# Patient Record
Sex: MALE | Race: WHITE | NOT HISPANIC OR LATINO
[De-identification: names, ages, dates, MRNs, and addresses within clinical notes are randomized per-mention and may not be internally consistent; named-entity substitution may affect disease eponyms.]

---

## 2018-09-11 PROBLEM — Z00.00 ENCOUNTER FOR PREVENTIVE HEALTH EXAMINATION: Status: ACTIVE | Noted: 2018-09-11

## 2018-09-21 ENCOUNTER — APPOINTMENT (OUTPATIENT)
Dept: ORTHOPEDIC SURGERY | Facility: CLINIC | Age: 28
End: 2018-09-21
Payer: COMMERCIAL

## 2018-09-21 VITALS
WEIGHT: 185 LBS | HEIGHT: 70 IN | HEART RATE: 86 BPM | DIASTOLIC BLOOD PRESSURE: 86 MMHG | SYSTOLIC BLOOD PRESSURE: 124 MMHG | BODY MASS INDEX: 26.48 KG/M2

## 2018-09-21 DIAGNOSIS — Z78.9 OTHER SPECIFIED HEALTH STATUS: ICD-10-CM

## 2018-09-21 DIAGNOSIS — F19.90 OTHER PSYCHOACTIVE SUBSTANCE USE, UNSPECIFIED, UNCOMPLICATED: ICD-10-CM

## 2018-09-21 DIAGNOSIS — Z60.2 PROBLEMS RELATED TO LIVING ALONE: ICD-10-CM

## 2018-09-21 PROCEDURE — 99205 OFFICE O/P NEW HI 60 MIN: CPT

## 2018-09-21 SDOH — SOCIAL STABILITY - SOCIAL INSECURITY: PROBLEMS RELATED TO LIVING ALONE: Z60.2

## 2018-09-24 PROBLEM — F19.90 OCCASIONAL RECREATIONAL DRUG USE: Status: ACTIVE | Noted: 2018-09-21

## 2018-09-24 PROBLEM — Z78.9 CONSUMES ALCOHOL OCCASIONALLY: Status: ACTIVE | Noted: 2018-09-21

## 2018-10-16 ENCOUNTER — OUTPATIENT (OUTPATIENT)
Dept: OUTPATIENT SERVICES | Facility: HOSPITAL | Age: 28
LOS: 1 days | End: 2018-10-16
Payer: COMMERCIAL

## 2018-10-16 VITALS
HEART RATE: 81 BPM | TEMPERATURE: 99 F | RESPIRATION RATE: 14 BRPM | WEIGHT: 184.09 LBS | OXYGEN SATURATION: 96 % | DIASTOLIC BLOOD PRESSURE: 93 MMHG | HEIGHT: 67 IN | SYSTOLIC BLOOD PRESSURE: 136 MMHG

## 2018-10-16 DIAGNOSIS — Z98.890 OTHER SPECIFIED POSTPROCEDURAL STATES: Chronic | ICD-10-CM

## 2018-10-16 DIAGNOSIS — M25.851 OTHER SPECIFIED JOINT DISORDERS, RIGHT HIP: ICD-10-CM

## 2018-10-16 DIAGNOSIS — M25.859 OTHER SPECIFIED JOINT DISORDERS, UNSPECIFIED HIP: ICD-10-CM

## 2018-10-16 DIAGNOSIS — Z01.818 ENCOUNTER FOR OTHER PREPROCEDURAL EXAMINATION: ICD-10-CM

## 2018-10-16 LAB
HCT VFR BLD CALC: 43.7 % — SIGNIFICANT CHANGE UP (ref 39–50)
HGB BLD-MCNC: 14.9 G/DL — SIGNIFICANT CHANGE UP (ref 13–17)
MCHC RBC-ENTMCNC: 29.4 PG — SIGNIFICANT CHANGE UP (ref 27–34)
MCHC RBC-ENTMCNC: 34.1 GM/DL — SIGNIFICANT CHANGE UP (ref 32–36)
MCV RBC AUTO: 86.4 FL — SIGNIFICANT CHANGE UP (ref 80–100)
PLATELET # BLD AUTO: 290 K/UL — SIGNIFICANT CHANGE UP (ref 150–400)
RBC # BLD: 5.06 M/UL — SIGNIFICANT CHANGE UP (ref 4.2–5.8)
RBC # FLD: 12.9 % — SIGNIFICANT CHANGE UP (ref 10.3–14.5)
WBC # BLD: 7.46 K/UL — SIGNIFICANT CHANGE UP (ref 3.8–10.5)
WBC # FLD AUTO: 7.46 K/UL — SIGNIFICANT CHANGE UP (ref 3.8–10.5)

## 2018-10-16 PROCEDURE — G0463: CPT

## 2018-10-16 PROCEDURE — 85027 COMPLETE CBC AUTOMATED: CPT

## 2018-10-16 RX ORDER — SODIUM CHLORIDE 9 MG/ML
3 INJECTION INTRAMUSCULAR; INTRAVENOUS; SUBCUTANEOUS EVERY 8 HOURS
Qty: 0 | Refills: 0 | Status: DISCONTINUED | OUTPATIENT
Start: 2018-10-23 | End: 2018-10-23

## 2018-10-16 RX ORDER — LIDOCAINE HCL 20 MG/ML
0.2 VIAL (ML) INJECTION ONCE
Qty: 0 | Refills: 0 | Status: DISCONTINUED | OUTPATIENT
Start: 2018-10-23 | End: 2018-10-23

## 2018-10-16 RX ORDER — CEFAZOLIN SODIUM 1 G
2000 VIAL (EA) INJECTION ONCE
Qty: 0 | Refills: 0 | Status: DISCONTINUED | OUTPATIENT
Start: 2018-10-23 | End: 2018-11-07

## 2018-10-16 NOTE — H&P PST ADULT - NSANTHOSAYNRD_GEN_A_CORE
No. DALJIT screening performed.  STOP BANG Legend: 0-2 = LOW Risk; 3-4 = INTERMEDIATE Risk; 5-8 = HIGH Risk

## 2018-10-16 NOTE — H&P PST ADULT - HISTORY OF PRESENT ILLNESS
Mr. Miller is a 28 year old man with no significant medical history with a "bone spur" causing increasing pain when working out and other activities including [facility] now scheduled for R hip Arthroscopy Resection of impingement and labral repair.

## 2018-10-16 NOTE — H&P PST ADULT - PROBLEM SELECTOR PLAN 1
Scheduled for hip arthroscopy, resection of impingement and labral repair.  Preop instructions given.

## 2018-10-16 NOTE — H&P PST ADULT - MALLAMPATI CLASS
Detail Level: Zone Detail Level: Detailed Class II - visualization of the soft palate, fauces, and uvula

## 2018-10-22 ENCOUNTER — TRANSCRIPTION ENCOUNTER (OUTPATIENT)
Age: 28
End: 2018-10-22

## 2018-10-23 ENCOUNTER — APPOINTMENT (OUTPATIENT)
Dept: ORTHOPEDIC SURGERY | Facility: HOSPITAL | Age: 28
End: 2018-10-23

## 2018-10-23 ENCOUNTER — OUTPATIENT (OUTPATIENT)
Dept: OUTPATIENT SERVICES | Facility: HOSPITAL | Age: 28
LOS: 1 days | End: 2018-10-23
Payer: COMMERCIAL

## 2018-10-23 VITALS
SYSTOLIC BLOOD PRESSURE: 130 MMHG | HEIGHT: 67 IN | DIASTOLIC BLOOD PRESSURE: 74 MMHG | OXYGEN SATURATION: 99 % | TEMPERATURE: 98 F | HEART RATE: 70 BPM | RESPIRATION RATE: 20 BRPM | WEIGHT: 184.09 LBS

## 2018-10-23 VITALS
TEMPERATURE: 98 F | SYSTOLIC BLOOD PRESSURE: 133 MMHG | HEART RATE: 75 BPM | DIASTOLIC BLOOD PRESSURE: 73 MMHG | RESPIRATION RATE: 16 BRPM | OXYGEN SATURATION: 100 %

## 2018-10-23 DIAGNOSIS — Z01.818 ENCOUNTER FOR OTHER PREPROCEDURAL EXAMINATION: ICD-10-CM

## 2018-10-23 DIAGNOSIS — Z98.890 OTHER SPECIFIED POSTPROCEDURAL STATES: Chronic | ICD-10-CM

## 2018-10-23 DIAGNOSIS — M25.859 OTHER SPECIFIED JOINT DISORDERS, UNSPECIFIED HIP: ICD-10-CM

## 2018-10-23 PROCEDURE — 97161 PT EVAL LOW COMPLEX 20 MIN: CPT

## 2018-10-23 PROCEDURE — 76000 FLUOROSCOPY <1 HR PHYS/QHP: CPT

## 2018-10-23 PROCEDURE — C1889: CPT

## 2018-10-23 PROCEDURE — 29916 HIP ARTHRO W/LABRAL REPAIR: CPT | Mod: RT

## 2018-10-23 PROCEDURE — C1713: CPT

## 2018-10-23 PROCEDURE — 29914 HIP ARTHRO W/FEMOROPLASTY: CPT | Mod: RT

## 2018-10-23 RX ORDER — OXYCODONE AND ACETAMINOPHEN 5; 325 MG/1; MG/1
5-325 TABLET ORAL
Qty: 30 | Refills: 0 | Status: ACTIVE | COMMUNITY
Start: 2018-10-23 | End: 1900-01-01

## 2018-10-23 RX ORDER — HYDROMORPHONE HYDROCHLORIDE 2 MG/ML
0.25 INJECTION INTRAMUSCULAR; INTRAVENOUS; SUBCUTANEOUS
Qty: 0 | Refills: 0 | Status: DISCONTINUED | OUTPATIENT
Start: 2018-10-23 | End: 2018-10-23

## 2018-10-23 RX ORDER — HYDROMORPHONE HYDROCHLORIDE 2 MG/ML
0.5 INJECTION INTRAMUSCULAR; INTRAVENOUS; SUBCUTANEOUS
Qty: 0 | Refills: 0 | Status: DISCONTINUED | OUTPATIENT
Start: 2018-10-23 | End: 2018-10-23

## 2018-10-23 RX ORDER — ASPIRIN/ACETAMINOPHEN/CAFFEINE 500-325-65
325 POWDER IN PACKET (EA) ORAL
Qty: 28 | Refills: 0 | Status: ACTIVE | COMMUNITY
Start: 2018-10-23 | End: 1900-01-01

## 2018-10-23 RX ORDER — OXYCODONE HYDROCHLORIDE 5 MG/1
5 TABLET ORAL
Qty: 0 | Refills: 0 | Status: DISCONTINUED | OUTPATIENT
Start: 2018-10-23 | End: 2018-10-23

## 2018-10-23 RX ORDER — SODIUM CHLORIDE 9 MG/ML
1000 INJECTION, SOLUTION INTRAVENOUS
Qty: 0 | Refills: 0 | Status: DISCONTINUED | OUTPATIENT
Start: 2018-10-23 | End: 2018-11-07

## 2018-10-23 RX ADMIN — HYDROMORPHONE HYDROCHLORIDE 0.5 MILLIGRAM(S): 2 INJECTION INTRAMUSCULAR; INTRAVENOUS; SUBCUTANEOUS at 12:00

## 2018-10-23 RX ADMIN — SODIUM CHLORIDE 3 MILLILITER(S): 9 INJECTION INTRAMUSCULAR; INTRAVENOUS; SUBCUTANEOUS at 07:43

## 2018-10-23 RX ADMIN — HYDROMORPHONE HYDROCHLORIDE 0.5 MILLIGRAM(S): 2 INJECTION INTRAMUSCULAR; INTRAVENOUS; SUBCUTANEOUS at 11:30

## 2018-10-23 RX ADMIN — HYDROMORPHONE HYDROCHLORIDE 0.5 MILLIGRAM(S): 2 INJECTION INTRAMUSCULAR; INTRAVENOUS; SUBCUTANEOUS at 11:45

## 2018-10-23 RX ADMIN — HYDROMORPHONE HYDROCHLORIDE 0.5 MILLIGRAM(S): 2 INJECTION INTRAMUSCULAR; INTRAVENOUS; SUBCUTANEOUS at 12:15

## 2018-10-23 RX ADMIN — SODIUM CHLORIDE 125 MILLILITER(S): 9 INJECTION, SOLUTION INTRAVENOUS at 12:08

## 2018-10-23 NOTE — PHYSICAL THERAPY INITIAL EVALUATION ADULT - GENERAL OBSERVATIONS, REHAB EVAL
Pt ken 35 min eval well. Pt agreed to session. Pt rec'd in chair, family at bedside, and NAD. Pt TTWB RLE as per ortho. Pt reports using axillary crutches in the past, reports comfortable with them previously.

## 2018-10-23 NOTE — PHYSICAL THERAPY INITIAL EVALUATION ADULT - GAIT DEVIATIONS NOTED, PT EVAL
pt able to walk and talk, no loss of balance, pt with good understanding of weight bearing restrictions

## 2018-10-23 NOTE — ASU DISCHARGE PLAN (ADULT/PEDIATRIC). - NURSING INSTRUCTIONS
Please ensure patient is seen by physical therapy for crutch training before discharge and please remind patient of:  please take aspirin 325mg x1 tablet twice a day, percocet for pain relief as needed, sent to HOME PHARMACY via [employer].  Toe touch weight bearing to right lower extremity x 2 weeks.   Please call Dr. Fleming office and make a follow up appointment x 2 weeks

## 2018-10-23 NOTE — BRIEF OPERATIVE NOTE - PROCEDURE
<<-----Click on this checkbox to enter Procedure Hip arthroscopy  10/23/2018  right hip  Active  LALY

## 2018-10-23 NOTE — PHYSICAL THERAPY INITIAL EVALUATION ADULT - PLANNED THERAPY INTERVENTIONS, PT EVAL
Outpatient PT at MD discretion. Pt demonstrates independence with functional mobility, good understanding of sequencing with axillary crutches and knowledge of weight bearing restrictions. Patient is cleared for D/C home from physical therapy standpoint. Pt and family aware and in agreement. ART Allen made aware.

## 2018-10-23 NOTE — BRIEF OPERATIVE NOTE - PRE-OP DX
Femoral acetabular impingement  10/23/2018  right hip  Active  Adi Samayoa  Tear of right acetabular labrum, subsequent encounter  10/23/2018  right hip  Active  Adi Samayoa

## 2018-10-23 NOTE — PHYSICAL THERAPY INITIAL EVALUATION ADULT - MANUAL MUSCLE TESTING RESULTS, REHAB EVAL
grossly assessed due to/BUE and LLE at least 4/5; RLE not formally assessed due to surgical status, but grossly appearing at least 3/5

## 2018-10-23 NOTE — PHYSICAL THERAPY INITIAL EVALUATION ADULT - ADDITIONAL COMMENTS
Pt states he lives with family in a private home, 2 steps to enter, and a flight of steps to bedroom (+handrail). Pt states prior to admission being independent with all functional mobility and ADLs. Pt states he plans on staying on first floor of home on discharge. Pt was driving and working prior to admission. Pt reports using crutches in the past. Pt reports being very active prior to admission.

## 2018-11-01 ENCOUNTER — APPOINTMENT (OUTPATIENT)
Dept: ORTHOPEDIC SURGERY | Facility: CLINIC | Age: 28
End: 2018-11-01
Payer: COMMERCIAL

## 2018-11-01 VITALS — HEIGHT: 70 IN | BODY MASS INDEX: 26.48 KG/M2 | WEIGHT: 185 LBS

## 2018-11-01 PROCEDURE — 99024 POSTOP FOLLOW-UP VISIT: CPT

## 2018-11-01 PROCEDURE — 73502 X-RAY EXAM HIP UNI 2-3 VIEWS: CPT | Mod: RT

## 2018-11-12 ENCOUNTER — OTHER (OUTPATIENT)
Age: 28
End: 2018-11-12

## 2018-11-16 ENCOUNTER — APPOINTMENT (OUTPATIENT)
Dept: ORTHOPEDIC SURGERY | Facility: CLINIC | Age: 28
End: 2018-11-16
Payer: COMMERCIAL

## 2018-11-16 PROCEDURE — 99024 POSTOP FOLLOW-UP VISIT: CPT

## 2019-02-15 ENCOUNTER — APPOINTMENT (OUTPATIENT)
Dept: ORTHOPEDIC SURGERY | Facility: CLINIC | Age: 29
End: 2019-02-15

## 2019-03-06 ENCOUNTER — APPOINTMENT (OUTPATIENT)
Dept: ORTHOPEDIC SURGERY | Facility: CLINIC | Age: 29
End: 2019-03-06
Payer: COMMERCIAL

## 2019-03-06 VITALS — WEIGHT: 185 LBS | BODY MASS INDEX: 26.48 KG/M2 | HEIGHT: 70 IN

## 2019-03-06 PROCEDURE — 99214 OFFICE O/P EST MOD 30 MIN: CPT

## 2019-03-06 PROCEDURE — 73502 X-RAY EXAM HIP UNI 2-3 VIEWS: CPT | Mod: RT

## 2019-05-10 ENCOUNTER — OTHER (OUTPATIENT)
Age: 29
End: 2019-05-10

## 2019-09-04 ENCOUNTER — APPOINTMENT (OUTPATIENT)
Dept: ORTHOPEDIC SURGERY | Facility: CLINIC | Age: 29
End: 2019-09-04
Payer: COMMERCIAL

## 2019-09-04 VITALS — WEIGHT: 185 LBS | BODY MASS INDEX: 26.48 KG/M2 | HEIGHT: 70 IN

## 2019-09-04 PROCEDURE — 73502 X-RAY EXAM HIP UNI 2-3 VIEWS: CPT | Mod: RT

## 2019-09-04 PROCEDURE — 99214 OFFICE O/P EST MOD 30 MIN: CPT

## 2019-10-02 PROBLEM — Z60.2 PERSON LIVING ALONE: Status: ACTIVE | Noted: 2018-09-21

## 2021-12-02 NOTE — H&P PST ADULT - PRO ANTICIPATED DISCH DISP
Patient received right side Supraclavicular nerve block  without Exparel.  Fentanyl 50mcg and Versed 1mg given. Tolerated procedure well.                       
home

## 2022-01-15 NOTE — H&P PST ADULT - BREASTS
Bed: ED23  Expected date:   Expected time:   Means of arrival:   Comments:  christinin  
not examined

## 2022-02-17 NOTE — PHYSICAL THERAPY INITIAL EVALUATION ADULT - PERTINENT HX OF CURRENT PROBLEM, REHAB EVAL
Chief Complaint   Patient presents with   • Convey Results     Discuss sleep study results       Here for follow-up.  My last office note reviewed.  Vitamin-D level was low, he is taking the recommended supplement now.  We did recheck the bilirubin which is elevated at 2.4 and that improved 1.2.  Recent hemoglobinemia up to 18.7.  Discussed potential causes at the last visit and I recommended screening for obstructive sleep apnea.  He had a sleep study which revealed mild obstructive sleep apnea with an AHI of 5.5.  He has been really trying to pay attention to symptoms and reports that he is not snoring and recently has been waking up feeling refreshed, feels that he has good energy level throughout the day, no need for naps.  Not aware of waking up with apneic episodes based on his own experience.  We discussed other potential causes of the elevated bilirubin including liver disease, alcohol use among others.  He reports that he has fiber 6 cocktails over the course of the weekend but does not drink on the week days.  He does not feel depressed.  Reports.  Reports his mood is good and certainly no thoughts of self-harm or violence.    Current Outpatient Medications   Medication Sig Dispense Refill   • Cholecalciferol (Vitamin D3) 50 mcg (2,000 units) capsule Take 2 capsules by mouth daily.       No current facility-administered medications for this visit.         ALLERGIES:  No Known Allergies    Past Medical History:   Diagnosis Date   • Anxiety and depression    • Erectile dysfunction 8/22/2018   • Headache(784.0)    • Hypertension    • Mild obstructive sleep apnea    • PONV (postoperative nausea and vomiting)        Social History     Tobacco Use   • Smoking status: Never Smoker   • Smokeless tobacco: Never Used   Substance Use Topics   • Alcohol use: Yes     Alcohol/week: 5.0 - 6.0 standard drinks     Types: 5 - 6 Standard drinks or equivalent per week     Social History     Substance and Sexual Activity    Drug Use No             Visit Vitals  /85 (BP Location: LUE - Left upper extremity, Patient Position: Sitting, Cuff Size: Regular)   Pulse 88   Temp 98.1 °F (36.7 °C)   Resp 20   Ht 5' 11.75\" (1.822 m)   Wt 91.8 kg (202 lb 4.8 oz)   BMI 27.63 kg/m²       Mood and affect normal.      PHYSICAL EXAM  General:  Patient appeared in no distress.  Vitals:  Vital signs reviewed, see nurse's notes.  HENT:  Normocephalic, atraumatic. Bilateral external ears normal. Tympanic membranes and ear canals normal. Oropharynx moist. No oral exudates. Nose normal.  Neck:  No jugular venous distention or lymphadenopathy.  Cardiovascular:  Regular rhythm. Normal sounds and absence of murmurs, rubs or gallops.  Lungs:  Clear to auscultation without rales, rhonchi, or wheezing.  Abdomen:  Soft, nontender, and without evidence of organomegaly, masses or guarding. Normal bowel sounds.  Extremities:  Nonedematous. No skin rashes. No cyanosis.  Neurologic:  Nonfocal and normal sensation.    Recent PHQ (Patient Health Questionnaire) 2/9 Score    PHQ 2:  Date Adult PHQ 2 Score Adult PHQ 2 Interpretation   2/16/2022 0 No further screening needed       PHQ 9:               ASSESSMENT AND PLAN:    1. Hemoglobinemia (CMS/HCC)  Discussed potential causes including the sleep apnea itself verses hematologic problems verses other issues associated with hypoxemia.  He denies any wheezing cough or shortness of breath at any time or history of asthma.  He did not have significant oxygen saturation in his sleep study per my review, this was a home study.  We did recommend APAP titration her the sleep study report however he is not inclined to do that based on the mild sleep apnea and relatively low AHI, his perceived lack of symptoms on recent monitoring.  We have agreed to recheck hemoglobin in 6 months.  If elevated then consider more seriously trial of CPAP verses hematology evaluation.  - CBC WITH DIFFERENTIAL; Future    2. Mild obstructive  sleep apnea      3. Primary hypertension  He has a history of hypertension and had been treated medically in the past.  Initial blood pressure elevated, look better on recheck.  He is going to continue working on lifestyle modification.  I have asked him to have some occasional blood pressure check here or elsewhere and if consistently above 140/90 he would let me know.  He has a cuff at home as well.  - COMPREHENSIVE METABOLIC PANEL; Future    4. Vitamin D deficiency  Continue the supplement.  - VITAMIN D -25 HYDROXY; Future    With regards a previous hyperbilirubinemia we will monitor that on six-month recheck a well.  Recommended cutting back on alcohol.  He denies excessive or daily use and does not feel this is likely to be a problem and we discussed that possibility.     29 y/o male with no significant medical history with a "bone spur" causing increasing pain when working out and other activities including [facility] now scheduled for R hip Arthroscopy Resection of impingement and labral repair.

## 2022-04-11 PROBLEM — M25.552 LEFT HIP PAIN: Status: ACTIVE | Noted: 2022-04-11

## 2022-04-12 ENCOUNTER — APPOINTMENT (OUTPATIENT)
Dept: ORTHOPEDIC SURGERY | Facility: CLINIC | Age: 32
End: 2022-04-12
Payer: COMMERCIAL

## 2022-04-12 VITALS — HEIGHT: 70 IN | WEIGHT: 185 LBS | BODY MASS INDEX: 26.48 KG/M2

## 2022-04-12 DIAGNOSIS — M25.859 OTHER SPECIFIED JOINT DISORDERS, UNSPECIFIED HIP: ICD-10-CM

## 2022-04-12 DIAGNOSIS — M25.552 PAIN IN LEFT HIP: ICD-10-CM

## 2022-04-12 PROCEDURE — 73502 X-RAY EXAM HIP UNI 2-3 VIEWS: CPT | Mod: LT

## 2022-04-12 PROCEDURE — 99214 OFFICE O/P EST MOD 30 MIN: CPT

## 2022-04-12 NOTE — PHYSICAL EXAM
[Normal Finger/nose] : finger to nose coordination [Normal Touch] : sensation intact for touch [Poor Appearance] : well-appearing [Normal] : no peripheral adenopathy appreciated [de-identified] : Patient appears stated age in no acute respiratory distress. Patient is alert oriented x3. Patient has normal mood and affect. \par Bilateral knee exam\par Range of motion of the knee is 0-120°. \par Skin is normal.  No rash.\par There is no effusion. No medial or lateral joint line tenderness. No swelling, no pitting edema.\par Overall alignment of the knee is then slight varus. Good anterior posterior stability. Firm endpoints on anterior and posterior drawer. \par Medial lateral stability is intact. Firm endpoint on medial and lateral stress testing .\par Thanh test is negative.\par Quadriceps strength 5/ 5. There is no loss of muscle volume in the thigh. \par Good anterior posterior and mediolateral stability.\par Sensation in the extremities intact. \par Discrimination is intact. Good DP and PT pulses.\par 		\par \par Bilateral hip exam\par The hip is mildly painful at the groin on log roll. \par At 70° flexion patient has minimal discomfort in the groin. At 90° flexion internal rotation is limited to less than 10°. External rotation is 25 no pain.\par Skin is normal. \par There is no loss of muscle wall remained the extremity. On lateral decubitus examination there is no tenderness in the greater trochanter. \par Resisted abduction is 4-5. There is no pain on abduction.\par Straight leg raise up to his 80° pain-free. No pain in the lower back.\par There is no adduction contracture. [de-identified] : X-rays of the right hip shows bone-on-bone arthritis.  X-rays of the left hip shows moderate arthritis with a lot of osteophyte formation.  2 views each

## 2022-04-12 NOTE — HISTORY OF PRESENT ILLNESS
[de-identified] : Patient is a 32 year-old male who presents to the office today for initial evaluation of left hip pain. He just recently began having left hip pain that is starting just like his right hip pain started a few years ago. He denies any injury or inciting event. He has been getting a "twinge" that is increasingly getting worse over the past 3 weeks. The pain is in the anterolateral aspect of the hip. It is sharp in nature. He takes some Advil which gives very mild relief. He presents today for further treatment options. [2] : a minimum pain level of 2/10 [6] : a maximum pain level of 6/10

## 2022-04-12 NOTE — DISCUSSION/SUMMARY
[de-identified] : At this time patient has X arthritis of bilateral hips right hip is advanced bone-on-bone arthritis.  And left hip is moderate arthritis.  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  At this time he is advised to take it is on the wrestling I do not think doing a hip arthroscopy in the left hip will actually be beneficial it might progress to left hip full-blown full-blown arthritis try conservative treatment NSAIDs therapy strengthening range of motion follow-up in 3 months

## 2024-03-14 ENCOUNTER — APPOINTMENT (OUTPATIENT)
Dept: ORTHOPEDIC SURGERY | Facility: CLINIC | Age: 34
End: 2024-03-14
Payer: COMMERCIAL

## 2024-03-14 DIAGNOSIS — M25.852 OTHER SPECIFIED JOINT DISORDERS, LEFT HIP: ICD-10-CM

## 2024-03-14 DIAGNOSIS — M16.10 UNILATERAL PRIMARY OSTEOARTHRITIS, UNSPECIFIED HIP: ICD-10-CM

## 2024-03-14 DIAGNOSIS — Q65.89 OTHER SPECIFIED CONGENITAL DEFORMITIES OF HIP: ICD-10-CM

## 2024-03-14 DIAGNOSIS — M25.851 OTHER SPECIFIED JOINT DISORDERS, RIGHT HIP: ICD-10-CM

## 2024-03-14 DIAGNOSIS — Z98.890 OTHER SPECIFIED POSTPROCEDURAL STATES: ICD-10-CM

## 2024-03-14 PROCEDURE — 73502 X-RAY EXAM HIP UNI 2-3 VIEWS: CPT

## 2024-03-14 PROCEDURE — 99204 OFFICE O/P NEW MOD 45 MIN: CPT

## 2024-03-14 RX ORDER — CELECOXIB 100 MG/1
100 CAPSULE ORAL TWICE DAILY
Qty: 30 | Refills: 1 | Status: ACTIVE | COMMUNITY
Start: 2024-03-14 | End: 1900-01-01

## 2024-03-14 NOTE — DISCUSSION/SUMMARY
[Medication Risks Reviewed] : Medication risks reviewed [de-identified] : discussed signfiicant hip dysplasia.  Discussed the RBA and recovery of a periacetabular osteotomy-  Due to previous hip scope and narcotic use pt wishes to avoid surgery and proceed with conservative treatment Informed pt that he probably will end up needing a total hip replacement but would recommend holding off due to his age  Pt would like to proceed with conservative treatment options in the form of CSI injections at this time  Plan for B/L hip CSI injection under radiology-  Rx  flouroscopically guided intraarticular corticosteroid injection to B/L hip Plan for PT rx: Celebrex  prn  ----------------------------------------------------------------------------  Rx: referral to interventional radiology for flouroscopically guided intraarticular corticosteroid injection to hip (bilaterally)  ----------------------------------------------------------------------------   All relevant imaging studies pertinent to today's visit, including x-rays, MRI's and/or other advanced imaging studies (CT/etc) were independently interpreted and reviewed with the patient as needed. Implications of the studies together with the patient's clinical picture were discussed to formulate a working diagnosis and management options were detailed.   The patient and/or guardian was advised of the diagnosis.  The natural history of the pathology was explained in full. All questions were answered.  The risks and benefits of conservative and interventional treatment alternatives were explained to the patient  The patient and/or guardian was advised if any advanced diagnostic/imaging study (MRI/CT/etc) is ordered to evaluate potential pathology in the affected area(s), they should follow up in the office to review the results of the study and determine further management that may be indicated.   ----------------------------------------------------------------------------  Patient warned of specific risks of medication related to bleeding, GI issues, increase blood pressure, and cardiac risks in addition to additional risks.  Patient advised to discuss with PMD  if any presence of stated issues.

## 2024-03-14 NOTE — HISTORY OF PRESENT ILLNESS
[Localized] : localized [Dull/Aching] : dull/aching [Sharp] : sharp [Frequent] : frequent [Nothing helps with pain getting better] : Nothing helps with pain getting better [Walking] : walking [Exercising] : exercising [de-identified] : This is Mr. ABDIRIZAK URIBE  a 34 year old male who comes in today complaining of Bt hip. 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 had surgery for the Rt hip years ago is looking to get some CSI shots. hx of R hip scope age 28 never fully recovered continue to have sig pain in the R and now is having progressive pain L hip. He is currently having pain with ambulation and execise and sports. He does [employer] [FreeTextEntry1] : Rt hip  [FreeTextEntry7] : Buttocks area

## 2024-03-14 NOTE — IMAGING
[Bilateral] : hip with pelvis bilaterally [Dysplasia] : Dysplasia [de-identified] :   ----------------------------------------------------------------------------   Bilateral hip exam:   Inspection: no deformity, no swelling, no gross limb length discrepancy ROM:    Flexion: R  90     ER: R 20 vs 20 L    IR:  R 0 vs 5 L Tenderness:    (neg) Groin tenderness    (neg) Greater trochanteric tenderness    (neg) Buttock tenderness    (neg) IT Band tenderness    (neg) Anterior thigh tenderness    (neg)ASIS tenderness    (neg) Ischial tuberosity    (neg) Hamstring muscle tenderness Additional tests:    (neg) FADIR    (neg) SHASHI    (neg) Resisted hip flexion pain    (neg) Apprehension (external rotation/extension)    (neg) Posterior pain with forced hip flexion and knee extension    (neg) Tight hamstrings    (neg) Log roll    (neg) Axial load        (+) pain over C-distribution Strength: 5/5 IP/Q/H/TA/GS/EHL Neuro: In tact to light touch throughout, DTR's wnl Vascularity: Extremity warm and well perfused Gait: normal.   [FreeTextEntry9] : significant dysplasia of the hips, LCEA: R -12.6, L -12,

## 2024-03-19 ENCOUNTER — RESULT REVIEW (OUTPATIENT)
Age: 34
End: 2024-03-19

## 2024-07-31 ENCOUNTER — APPOINTMENT (OUTPATIENT)
Dept: ORTHOPEDIC SURGERY | Facility: CLINIC | Age: 34
End: 2024-07-31

## 2024-08-02 ENCOUNTER — APPOINTMENT (OUTPATIENT)
Dept: ORTHOPEDIC SURGERY | Facility: CLINIC | Age: 34
End: 2024-08-02
Payer: MEDICAID

## 2024-08-02 VITALS — BODY MASS INDEX: 26.48 KG/M2 | WEIGHT: 185 LBS | HEIGHT: 70 IN

## 2024-08-02 DIAGNOSIS — Q65.89 OTHER SPECIFIED CONGENITAL DEFORMITIES OF HIP: ICD-10-CM

## 2024-08-02 DIAGNOSIS — M25.852 OTHER SPECIFIED JOINT DISORDERS, LEFT HIP: ICD-10-CM

## 2024-08-02 DIAGNOSIS — M25.851 OTHER SPECIFIED JOINT DISORDERS, RIGHT HIP: ICD-10-CM

## 2024-08-02 DIAGNOSIS — M16.10 UNILATERAL PRIMARY OSTEOARTHRITIS, UNSPECIFIED HIP: ICD-10-CM

## 2024-08-02 PROCEDURE — 99214 OFFICE O/P EST MOD 30 MIN: CPT

## 2024-08-02 NOTE — REASON FOR VISIT
[FreeTextEntry2] : Follow up- Bt hip - Pt did well with prev hip inj and wishes to repeat them today. Pt also is looking to pursue Stem cells

## 2024-08-02 NOTE — IMAGING
[Bilateral] : hip with pelvis bilaterally [Dysplasia] : Dysplasia [de-identified] :   ----------------------------------------------------------------------------   Bilateral hip exam:   Inspection: no deformity, no swelling, no gross limb length discrepancy ROM:    Flexion: R  90     ER: R 20 vs 20 L    IR:  R 0 vs 5 L Tenderness:    (neg) Groin tenderness    (neg) Greater trochanteric tenderness    (neg) Buttock tenderness    (neg) IT Band tenderness    (neg) Anterior thigh tenderness    (neg)ASIS tenderness    (neg) Ischial tuberosity    (neg) Hamstring muscle tenderness Additional tests:    (neg) FADIR    (neg) SHASHI    (neg) Resisted hip flexion pain    (neg) Apprehension (external rotation/extension)    (neg) Posterior pain with forced hip flexion and knee extension    (neg) Tight hamstrings    (neg) Log roll    (neg) Axial load        (+) pain over C-distribution Strength: 5/5 IP/Q/H/TA/GS/EHL Neuro: In tact to light touch throughout, DTR's wnl Vascularity: Extremity warm and well perfused Gait: normal.   [FreeTextEntry9] : significant dysplasia of the hips, LCEA: R -12.6, L -12,

## 2024-08-02 NOTE — DISCUSSION/SUMMARY
[de-identified] : Had lengthy discussion regarding PRP and other regenerative treatment and other [employer] (adipose derived and placenta derived stem cells ) since pt is considering  treatment options outside of the practice. Discussed RBA and answered all questions Informed pt that he probably will end up needing a total hip replacement  Plan for B/L hip CSI injection under radiology-  Rx  flouroscopically guided intraarticular corticosteroid injection to B/L hip f/u 3 month  ----------------------------------------------------------------------------  Rx: referral to interventional radiology for flouroscopically guided intraarticular corticosteroid injection to hip (bilaterally)   ----------------------------------------------------------------------------   All relevant imaging studies pertinent to today's visit, including x-rays, MRI's and/or other advanced imaging studies (CT/etc) were independently interpreted and reviewed with the patient as needed. Implications of the studies together with the patient's clinical picture were discussed to formulate a working diagnosis and management options were detailed.   The patient and/or guardian was advised of the diagnosis.  The natural history of the pathology was explained in full. All questions were answered.  The risks and benefits of conservative and interventional treatment alternatives were explained to the patient  The patient and/or guardian was advised if any advanced diagnostic/imaging study (MRI/CT/etc) is ordered to evaluate potential pathology in the affected area(s), they should follow up in the office to review the results of the study and determine further management that may be indicated.   ----------------------------------------------------------------------------

## 2024-08-02 NOTE — HISTORY OF PRESENT ILLNESS
[7] : 7 [Dull/Aching] : dull/aching [Localized] : localized [Sharp] : sharp [Frequent] : frequent [Nothing helps with pain getting better] : Nothing helps with pain getting better [Walking] : walking [Exercising] : exercising [de-identified] : This is Mr. ABDIRIZAK URIBE  a 34 year old male who comes in today complaining of Bt hip. 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 had surgery for the Rt hip years ago is looking to get some CSI shots. hx of R hip scope age 28 never fully recovered continue to have sig pain in the R and now is having progressive pain L hip. He is currently having pain with ambulation and execise and sports. He does [employer] [FreeTextEntry1] : Rt hip  [FreeTextEntry7] : Buttocks area

## 2024-08-08 ENCOUNTER — RESULT REVIEW (OUTPATIENT)
Age: 34
End: 2024-08-08

## 2024-08-26 NOTE — ASU DISCHARGE PLAN (ADULT/PEDIATRIC). - SPECIAL INSTRUCTIONS
Needs appt   please take aspirin 325 tablet twice a day, percocet for pain relief as needed, sent to home pharmacy via [employer].  Toe touch weight bearing to right lower extremity x 2 weeks.   Please call Dr. Fleming office and make a follow up appointment x 2 weeks

## 2025-03-06 ENCOUNTER — APPOINTMENT (OUTPATIENT)
Dept: ORTHOPEDIC SURGERY | Facility: CLINIC | Age: 35
End: 2025-03-06
Payer: MEDICAID

## 2025-03-06 VITALS — HEIGHT: 70 IN | WEIGHT: 185 LBS | BODY MASS INDEX: 26.48 KG/M2

## 2025-03-06 DIAGNOSIS — M16.12 UNILATERAL PRIMARY OSTEOARTHRITIS, LEFT HIP: ICD-10-CM

## 2025-03-06 DIAGNOSIS — M16.11 UNILATERAL PRIMARY OSTEOARTHRITIS, RIGHT HIP: ICD-10-CM

## 2025-03-06 DIAGNOSIS — M16.10 UNILATERAL PRIMARY OSTEOARTHRITIS, UNSPECIFIED HIP: ICD-10-CM

## 2025-03-06 PROCEDURE — 72170 X-RAY EXAM OF PELVIS: CPT

## 2025-03-06 PROCEDURE — 99215 OFFICE O/P EST HI 40 MIN: CPT

## 2025-03-07 PROBLEM — M16.12 ARTHRITIS OF LEFT HIP: Status: ACTIVE | Noted: 2025-03-07

## 2025-03-07 PROBLEM — M16.11 ARTHRITIS OF RIGHT HIP: Status: ACTIVE | Noted: 2025-03-07

## 2025-04-30 ENCOUNTER — OUTPATIENT (OUTPATIENT)
Dept: OUTPATIENT SERVICES | Facility: HOSPITAL | Age: 35
LOS: 1 days | End: 2025-04-30

## 2025-04-30 VITALS
RESPIRATION RATE: 14 BRPM | DIASTOLIC BLOOD PRESSURE: 70 MMHG | SYSTOLIC BLOOD PRESSURE: 108 MMHG | HEART RATE: 76 BPM | TEMPERATURE: 98 F | OXYGEN SATURATION: 99 % | HEIGHT: 70 IN | WEIGHT: 175.05 LBS

## 2025-04-30 DIAGNOSIS — Z98.890 OTHER SPECIFIED POSTPROCEDURAL STATES: Chronic | ICD-10-CM

## 2025-04-30 DIAGNOSIS — M16.11 UNILATERAL PRIMARY OSTEOARTHRITIS, RIGHT HIP: ICD-10-CM

## 2025-04-30 DIAGNOSIS — M19.90 UNSPECIFIED OSTEOARTHRITIS, UNSPECIFIED SITE: ICD-10-CM

## 2025-04-30 LAB
A1C WITH ESTIMATED AVERAGE GLUCOSE RESULT: 4.7 % — SIGNIFICANT CHANGE UP (ref 4–5.6)
ANION GAP SERPL CALC-SCNC: 13 MMOL/L — SIGNIFICANT CHANGE UP (ref 7–14)
APPEARANCE UR: CLEAR — SIGNIFICANT CHANGE UP
BASOPHILS # BLD AUTO: 0.03 K/UL — SIGNIFICANT CHANGE UP (ref 0–0.2)
BASOPHILS NFR BLD AUTO: 0.4 % — SIGNIFICANT CHANGE UP (ref 0–2)
BILIRUB UR-MCNC: NEGATIVE — SIGNIFICANT CHANGE UP
BLD GP AB SCN SERPL QL: NEGATIVE — SIGNIFICANT CHANGE UP
BUN SERPL-MCNC: 19 MG/DL — SIGNIFICANT CHANGE UP (ref 7–23)
CALCIUM SERPL-MCNC: 9.1 MG/DL — SIGNIFICANT CHANGE UP (ref 8.4–10.5)
CHLORIDE SERPL-SCNC: 96 MMOL/L — LOW (ref 98–107)
CO2 SERPL-SCNC: 27 MMOL/L — SIGNIFICANT CHANGE UP (ref 22–31)
COLOR SPEC: YELLOW — SIGNIFICANT CHANGE UP
CREAT SERPL-MCNC: 1.01 MG/DL — SIGNIFICANT CHANGE UP (ref 0.5–1.3)
DIFF PNL FLD: NEGATIVE — SIGNIFICANT CHANGE UP
EGFR: 100 ML/MIN/1.73M2 — SIGNIFICANT CHANGE UP
EGFR: 100 ML/MIN/1.73M2 — SIGNIFICANT CHANGE UP
EOSINOPHIL # BLD AUTO: 0.02 K/UL — SIGNIFICANT CHANGE UP (ref 0–0.5)
EOSINOPHIL NFR BLD AUTO: 0.3 % — SIGNIFICANT CHANGE UP (ref 0–6)
ESTIMATED AVERAGE GLUCOSE: 88 — SIGNIFICANT CHANGE UP
GLUCOSE SERPL-MCNC: 52 MG/DL — CRITICAL LOW (ref 70–99)
GLUCOSE UR QL: NEGATIVE MG/DL — SIGNIFICANT CHANGE UP
HCT VFR BLD CALC: 35.4 % — LOW (ref 39–50)
HGB BLD-MCNC: 11.8 G/DL — LOW (ref 13–17)
IMM GRANULOCYTES NFR BLD AUTO: 0.1 % — SIGNIFICANT CHANGE UP (ref 0–0.9)
KETONES UR-MCNC: NEGATIVE MG/DL — SIGNIFICANT CHANGE UP
LEUKOCYTE ESTERASE UR-ACNC: NEGATIVE — SIGNIFICANT CHANGE UP
LYMPHOCYTES # BLD AUTO: 2.19 K/UL — SIGNIFICANT CHANGE UP (ref 1–3.3)
LYMPHOCYTES # BLD AUTO: 27.6 % — SIGNIFICANT CHANGE UP (ref 13–44)
MCHC RBC-ENTMCNC: 29.6 PG — SIGNIFICANT CHANGE UP (ref 27–34)
MCHC RBC-ENTMCNC: 33.3 G/DL — SIGNIFICANT CHANGE UP (ref 32–36)
MCV RBC AUTO: 88.7 FL — SIGNIFICANT CHANGE UP (ref 80–100)
MONOCYTES # BLD AUTO: 0.42 K/UL — SIGNIFICANT CHANGE UP (ref 0–0.9)
MONOCYTES NFR BLD AUTO: 5.3 % — SIGNIFICANT CHANGE UP (ref 2–14)
NEUTROPHILS # BLD AUTO: 5.27 K/UL — SIGNIFICANT CHANGE UP (ref 1.8–7.4)
NEUTROPHILS NFR BLD AUTO: 66.3 % — SIGNIFICANT CHANGE UP (ref 43–77)
NITRITE UR-MCNC: NEGATIVE — SIGNIFICANT CHANGE UP
PH UR: 6.5 — SIGNIFICANT CHANGE UP (ref 5–8)
PLATELET # BLD AUTO: 280 K/UL — SIGNIFICANT CHANGE UP (ref 150–400)
POTASSIUM SERPL-MCNC: 3.8 MMOL/L — SIGNIFICANT CHANGE UP (ref 3.5–5.3)
POTASSIUM SERPL-SCNC: 3.8 MMOL/L — SIGNIFICANT CHANGE UP (ref 3.5–5.3)
PROT UR-MCNC: NEGATIVE MG/DL — SIGNIFICANT CHANGE UP
RBC # BLD: 3.99 M/UL — LOW (ref 4.2–5.8)
RBC # FLD: 12.7 % — SIGNIFICANT CHANGE UP (ref 10.3–14.5)
RETICS #: 34.8 K/UL — SIGNIFICANT CHANGE UP (ref 25–125)
RETICS/RBC NFR: 0.9 % — SIGNIFICANT CHANGE UP (ref 0.5–2.5)
RH IG SCN BLD-IMP: POSITIVE — SIGNIFICANT CHANGE UP
RH IG SCN BLD-IMP: POSITIVE — SIGNIFICANT CHANGE UP
SODIUM SERPL-SCNC: 136 MMOL/L — SIGNIFICANT CHANGE UP (ref 135–145)
SP GR SPEC: 1.01 — SIGNIFICANT CHANGE UP (ref 1–1.03)
UROBILINOGEN FLD QL: 0.2 MG/DL — SIGNIFICANT CHANGE UP (ref 0.2–1)
WBC # BLD: 7.94 K/UL — SIGNIFICANT CHANGE UP (ref 3.8–10.5)
WBC # FLD AUTO: 7.94 K/UL — SIGNIFICANT CHANGE UP (ref 3.8–10.5)

## 2025-04-30 NOTE — H&P PST ADULT - NSICDXPASTSURGICALHX_GEN_ALL_CORE_FT
PAST SURGICAL HISTORY:  S/P foot surgery, right 2007, pin in ankle     PAST SURGICAL HISTORY:  H/O arthroscopy of hip     S/P foot surgery, right 2007, pin in ankle

## 2025-04-30 NOTE — H&P PST ADULT - HEIGHT IN INCHES
Patient ID: Elisha Grimes is a 29 y.o. female.    Chief Complaint: Vaginal Discharge (Entered automatically based on patient selection in Patient Portal.)    The patient initiated a request through [facility] on 4/12/2024 for evaluation and management with a chief complaint of Vaginal Discharge (Entered automatically based on patient selection in Patient Portal.)     I evaluated the questionnaire submission on 4/12/2024.    [facility] Peq Evisit Vaginal Discharge    4/12/2024 10:25 AM CDT - Filed by Patient   Do you agree to participate in an E-Visit? Yes   If you have any of the following symptoms,  please do not complete an E-Visit,  schedule an appointment with your provider: I acknowledge   What is the main issue you would like addressed today? Yeast infection   Are you able to take your vital signs? No   Are you pregnant, could you be pregnant, or are you breast feeding? None of the above   Which of the following are you experiencing? Vaginal Itching    Are you having pain while passing urine? No, I have no pain while urinating.   Which of the following applies to your vaginal discharge? I have a white/milky discharge.    Which of the following are you experiencing? None of the above   Do you have any sores on your genitals? No    Have you taken antibiotics recently? Yes, I recently took some   Please enter when you took antibiotics, and the name of the medicine, if you know it. Augmentin about a week ago    Do you use any of the following? None of the above   Which of the following applies to your menstrual period? I had a menstrual period in the last 2 weeks.   Have you had similar symptoms in the past? Yes, I have had had similar symptoms more than once before.   When you had similar symptoms in the past, did any of the following work? Pills for yeast infection   Have you had a fever? No   During the last 2 months, have you had sexual contact with a specific person for the first time? No   Provide any additional  information you feel is important.    Please attach any relevant images or files          Encounter Diagnosis   Name Primary?    Vaginal yeast infection Yes        1. Vaginal yeast infection  See handout on discharge summary/after-visit summary for home care instructions.  Take Diflucan x 1 dose now.  -     fluconazole (DIFLUCAN) 150 MG Tab; Take 1 tablet (150 mg total) by mouth once daily. for 1 day  Dispense: 1 tablet; Refill: 0        Medications Ordered This Encounter   Medications    fluconazole (DIFLUCAN) 150 MG Tab     Sig: Take 1 tablet (150 mg total) by mouth once daily. for 1 day     Dispense:  1 tablet     Refill:  0        No follow-ups on file.      E-Visit Time Tracking:    Day 1 Time (in minutes): 7    Total Time (in minutes): 7                10

## 2025-04-30 NOTE — H&P PST ADULT - GASTROINTESTINAL
details… soft/nontender/nondistended/normal active bowel sounds/no guarding/no rigidity/no organomegaly/no palpable hamida/no masses palpable

## 2025-04-30 NOTE — H&P PST ADULT - MUSCULOSKELETAL
details… ROM intact ROM intact/strength 5/5 bilateral upper extremities/strength 5/5 bilateral lower extremities

## 2025-04-30 NOTE — H&P PST ADULT - HISTORY OF PRESENT ILLNESS
36y/o male scheduled for right total hip replacement direct anterior approach on 5/19/2025.  Pt report has hx of shallow hip joints since birth, underwent hip sulpt 2017.  Pain improved at that time, progressively returned, worse over the past few years.  Pain worse with standing and walking.  Expected outcome  to be able to live a active lifestyle without pain.      34y/o male scheduled for right total hip replacement direct anterior approach on 5/19/2025.  Pt report has hx of shallow hip sockets since birth,  bilateral hip pain for man years.  Underwent right hip arthroscopy labral repair 2018.   Pain improved at that time, progressively returned, worse over the past few years.  Pain worse with standing and walking.  Xray shows bone on bone.  Expected outcome  to be able to live a active lifestyle without pain.

## 2025-04-30 NOTE — H&P PST ADULT - PROBLEM SELECTOR PLAN 1
Pt scheduled for right total hip replacement direct anterior approach on 5/19/2025.  labs done results pending.  Chlorhexidine provided-  written and verbal instructions given, with teach back, pt able to verbalize understanding.  Preop teaching done, pt able to verbalize understanding.   medication day of procedure-  none

## 2025-04-30 NOTE — H&P PST ADULT - NSICDXPASTMEDICALHX_GEN_ALL_CORE_FT
PAST MEDICAL HISTORY:  No pertinent past medical history      PAST MEDICAL HISTORY:  Ankle fracture, right, sequela     Femoral acetabular impingement     Osteoarthritis

## 2025-04-30 NOTE — H&P PST ADULT - MUSCULOSKELETAL COMMENTS
bilateral hip pain, right worse than left xray shows bone on bone right hip pain with rom, walks with limp

## 2025-05-01 LAB
CRP SERPL-MCNC: <3 MG/L — SIGNIFICANT CHANGE UP
CULTURE RESULTS: NO GROWTH — SIGNIFICANT CHANGE UP
FERRITIN SERPL-MCNC: 170 NG/ML — SIGNIFICANT CHANGE UP (ref 30–400)
FOLATE SERPL-MCNC: 11.1 NG/ML — SIGNIFICANT CHANGE UP
IRON SATN MFR SERPL: 28 % — SIGNIFICANT CHANGE UP (ref 16–55)
IRON SATN MFR SERPL: 68 UG/DL — SIGNIFICANT CHANGE UP (ref 45–165)
MRSA PCR RESULT.: SIGNIFICANT CHANGE UP
S AUREUS DNA NOSE QL NAA+PROBE: DETECTED
SPECIMEN SOURCE: SIGNIFICANT CHANGE UP
TIBC SERPL-MCNC: 246 UG/DL — SIGNIFICANT CHANGE UP (ref 220–430)
UIBC SERPL-MCNC: 178 UG/DL — SIGNIFICANT CHANGE UP (ref 110–370)
VIT B12 SERPL-MCNC: 494 PG/ML — SIGNIFICANT CHANGE UP (ref 232–1245)

## 2025-05-02 PROBLEM — S82.891S OTHER FRACTURE OF RIGHT LOWER LEG, SEQUELA: Chronic | Status: ACTIVE | Noted: 2025-04-30

## 2025-05-02 PROBLEM — M19.90 UNSPECIFIED OSTEOARTHRITIS, UNSPECIFIED SITE: Chronic | Status: ACTIVE | Noted: 2025-04-30

## 2025-05-02 PROBLEM — M25.859 OTHER SPECIFIED JOINT DISORDERS, UNSPECIFIED HIP: Chronic | Status: ACTIVE | Noted: 2025-04-30

## 2025-05-06 NOTE — H&P PST ADULT - SYMPTOMS
med ball toss.     none

## 2025-05-16 NOTE — ASU PATIENT PROFILE, ADULT - NSICDXPASTMEDICALHX_GEN_ALL_CORE_FT
PAST MEDICAL HISTORY:  Ankle fracture, right, sequela     Femoral acetabular impingement     Osteoarthritis

## 2025-05-16 NOTE — ASU PATIENT PROFILE, ADULT - FALL HARM RISK - UNIVERSAL INTERVENTIONS
Bed in lowest position, wheels locked, appropriate side rails in place/Call bell, personal items and telephone in reach/Instruct patient to call for assistance before getting out of bed or chair/Non-slip footwear when patient is out of bed/[place] to call system/Physically safe environment - no spills, clutter or unnecessary equipment/Purposeful Proactive Rounding/Room/bathroom lighting operational, light cord in reach

## 2025-05-19 ENCOUNTER — OUTPATIENT (OUTPATIENT)
Dept: OUTPATIENT SERVICES | Facility: HOSPITAL | Age: 35
LOS: 1 days | End: 2025-05-19
Payer: MEDICAID

## 2025-05-19 ENCOUNTER — TRANSCRIPTION ENCOUNTER (OUTPATIENT)
Age: 35
End: 2025-05-19

## 2025-05-19 ENCOUNTER — RESULT REVIEW (OUTPATIENT)
Age: 35
End: 2025-05-19

## 2025-05-19 ENCOUNTER — APPOINTMENT (OUTPATIENT)
Dept: ORTHOPEDIC SURGERY | Facility: HOSPITAL | Age: 35
End: 2025-05-19

## 2025-05-19 VITALS
SYSTOLIC BLOOD PRESSURE: 103 MMHG | TEMPERATURE: 98 F | RESPIRATION RATE: 16 BRPM | HEART RATE: 73 BPM | DIASTOLIC BLOOD PRESSURE: 68 MMHG | OXYGEN SATURATION: 100 %

## 2025-05-19 VITALS
WEIGHT: 175.05 LBS | RESPIRATION RATE: 16 BRPM | TEMPERATURE: 98 F | DIASTOLIC BLOOD PRESSURE: 66 MMHG | HEIGHT: 70 IN | SYSTOLIC BLOOD PRESSURE: 109 MMHG | OXYGEN SATURATION: 94 % | HEART RATE: 78 BPM

## 2025-05-19 DIAGNOSIS — Z98.890 OTHER SPECIFIED POSTPROCEDURAL STATES: Chronic | ICD-10-CM

## 2025-05-19 DIAGNOSIS — M16.11 UNILATERAL PRIMARY OSTEOARTHRITIS, RIGHT HIP: ICD-10-CM

## 2025-05-19 LAB — GLUCOSE BLDC GLUCOMTR-MCNC: 80 MG/DL — SIGNIFICANT CHANGE UP (ref 70–99)

## 2025-05-19 PROCEDURE — 27130 TOTAL HIP ARTHROPLASTY: CPT | Mod: RT

## 2025-05-19 PROCEDURE — 72170 X-RAY EXAM OF PELVIS: CPT | Mod: 26

## 2025-05-19 DEVICE — STEM NECK ANG HIP V40 127D SZ 6 35X111MM: Type: IMPLANTABLE DEVICE | Site: RIGHT | Status: FUNCTIONAL

## 2025-05-19 DEVICE — SHELL ACET TRIDENT II E 52MM: Type: IMPLANTABLE DEVICE | Site: RIGHT | Status: FUNCTIONAL

## 2025-05-19 DEVICE — HEAD FEM CER V40 36MM  PLUS 0MM: Type: IMPLANTABLE DEVICE | Site: RIGHT | Status: FUNCTIONAL

## 2025-05-19 DEVICE — LINER ACET TRIDENT X3 0 DEG 36MM E: Type: IMPLANTABLE DEVICE | Site: RIGHT | Status: FUNCTIONAL

## 2025-05-19 RX ORDER — POVIDONE-IODINE 7.5 %
1 SOLUTION, NON-ORAL TOPICAL ONCE
Refills: 0 | Status: COMPLETED | OUTPATIENT
Start: 2025-05-19 | End: 2025-05-19

## 2025-05-19 RX ORDER — NALOXONE HYDROCHLORIDE 0.4 MG/ML
1 INJECTION, SOLUTION INTRAMUSCULAR; INTRAVENOUS; SUBCUTANEOUS
Qty: 1 | Refills: 0
Start: 2025-05-19 | End: 2025-05-19

## 2025-05-19 RX ORDER — CEFAZOLIN SODIUM IN 0.9 % NACL 3 G/100 ML
2000 INTRAVENOUS SOLUTION, PIGGYBACK (ML) INTRAVENOUS EVERY 8 HOURS
Refills: 0 | Status: DISCONTINUED | OUTPATIENT
Start: 2025-05-19 | End: 2025-05-19

## 2025-05-19 RX ORDER — ACETAMINOPHEN 500 MG/5ML
1000 LIQUID (ML) ORAL ONCE
Refills: 0 | Status: DISCONTINUED | OUTPATIENT
Start: 2025-05-19 | End: 2025-06-02

## 2025-05-19 RX ORDER — POLYETHYLENE GLYCOL 3350 17 G/17G
17 POWDER, FOR SOLUTION ORAL
Qty: 119 | Refills: 0
Start: 2025-05-19 | End: 2025-05-25

## 2025-05-19 RX ORDER — DEXAMETHASONE 0.5 MG/1
8 TABLET ORAL ONCE
Refills: 0 | Status: DISCONTINUED | OUTPATIENT
Start: 2025-05-20 | End: 2025-06-02

## 2025-05-19 RX ORDER — SODIUM CHLORIDE 9 G/1000ML
1000 INJECTION, SOLUTION INTRAVENOUS
Refills: 0 | Status: DISCONTINUED | OUTPATIENT
Start: 2025-05-19 | End: 2025-06-02

## 2025-05-19 RX ORDER — MAGNESIUM HYDROXIDE 400 MG/5ML
30 SUSPENSION ORAL DAILY
Refills: 0 | Status: DISCONTINUED | OUTPATIENT
Start: 2025-05-19 | End: 2025-06-02

## 2025-05-19 RX ORDER — ACETAMINOPHEN 500 MG/5ML
2 LIQUID (ML) ORAL
Qty: 84 | Refills: 0
Start: 2025-05-19 | End: 2025-06-01

## 2025-05-19 RX ORDER — TRAMADOL HYDROCHLORIDE 50 MG/1
50 TABLET, FILM COATED ORAL EVERY 6 HOURS
Refills: 0 | Status: DISCONTINUED | OUTPATIENT
Start: 2025-05-19 | End: 2025-05-19

## 2025-05-19 RX ORDER — ACETAMINOPHEN 500 MG/5ML
1000 LIQUID (ML) ORAL ONCE
Refills: 0 | Status: DISCONTINUED | OUTPATIENT
Start: 2025-05-20 | End: 2025-06-02

## 2025-05-19 RX ORDER — SENNA 187 MG
2 TABLET ORAL AT BEDTIME
Refills: 0 | Status: DISCONTINUED | OUTPATIENT
Start: 2025-05-19 | End: 2025-06-02

## 2025-05-19 RX ORDER — CEFAZOLIN SODIUM IN 0.9 % NACL 3 G/100 ML
2000 INTRAVENOUS SOLUTION, PIGGYBACK (ML) INTRAVENOUS EVERY 8 HOURS
Refills: 0 | Status: COMPLETED | OUTPATIENT
Start: 2025-05-19 | End: 2025-05-19

## 2025-05-19 RX ORDER — OXYCODONE HYDROCHLORIDE 30 MG/1
1 TABLET ORAL
Qty: 28 | Refills: 0
Start: 2025-05-19 | End: 2025-05-25

## 2025-05-19 RX ORDER — SENNA 187 MG
2 TABLET ORAL
Qty: 14 | Refills: 0
Start: 2025-05-19 | End: 2025-05-25

## 2025-05-19 RX ORDER — OXYCODONE HYDROCHLORIDE 30 MG/1
5 TABLET ORAL EVERY 4 HOURS
Refills: 0 | Status: DISCONTINUED | OUTPATIENT
Start: 2025-05-19 | End: 2025-05-19

## 2025-05-19 RX ORDER — CELECOXIB 50 MG/1
1 CAPSULE ORAL
Qty: 14 | Refills: 0
Start: 2025-05-19 | End: 2025-05-25

## 2025-05-19 RX ORDER — ONDANSETRON HCL/PF 4 MG/2 ML
4 VIAL (ML) INJECTION ONCE
Refills: 0 | Status: DISCONTINUED | OUTPATIENT
Start: 2025-05-19 | End: 2025-06-02

## 2025-05-19 RX ORDER — ACETAMINOPHEN 500 MG/5ML
975 LIQUID (ML) ORAL EVERY 8 HOURS
Refills: 0 | Status: DISCONTINUED | OUTPATIENT
Start: 2025-05-20 | End: 2025-06-02

## 2025-05-19 RX ORDER — CELECOXIB 50 MG/1
200 CAPSULE ORAL EVERY 12 HOURS
Refills: 0 | Status: DISCONTINUED | OUTPATIENT
Start: 2025-05-21 | End: 2025-06-02

## 2025-05-19 RX ORDER — ASPIRIN 325 MG
81 TABLET ORAL
Refills: 0 | Status: DISCONTINUED | OUTPATIENT
Start: 2025-05-19 | End: 2025-06-02

## 2025-05-19 RX ORDER — ONDANSETRON HCL/PF 4 MG/2 ML
4 VIAL (ML) INJECTION EVERY 6 HOURS
Refills: 0 | Status: DISCONTINUED | OUTPATIENT
Start: 2025-05-19 | End: 2025-06-02

## 2025-05-19 RX ORDER — TRAMADOL HYDROCHLORIDE 50 MG/1
50 TABLET, FILM COATED ORAL ONCE
Refills: 0 | Status: DISCONTINUED | OUTPATIENT
Start: 2025-05-19 | End: 2025-05-19

## 2025-05-19 RX ORDER — OXYCODONE HYDROCHLORIDE 30 MG/1
10 TABLET ORAL EVERY 4 HOURS
Refills: 0 | Status: DISCONTINUED | OUTPATIENT
Start: 2025-05-19 | End: 2025-05-19

## 2025-05-19 RX ORDER — HYDROMORPHONE/SOD CHLOR,ISO/PF 2 MG/10 ML
0.25 SYRINGE (ML) INJECTION
Refills: 0 | Status: DISCONTINUED | OUTPATIENT
Start: 2025-05-19 | End: 2025-05-19

## 2025-05-19 RX ORDER — HYDROMORPHONE/SOD CHLOR,ISO/PF 2 MG/10 ML
0.5 SYRINGE (ML) INJECTION ONCE
Refills: 0 | Status: DISCONTINUED | OUTPATIENT
Start: 2025-05-19 | End: 2025-05-19

## 2025-05-19 RX ORDER — KETOROLAC TROMETHAMINE 30 MG/ML
15 INJECTION, SOLUTION INTRAMUSCULAR; INTRAVENOUS EVERY 6 HOURS
Refills: 0 | Status: COMPLETED | OUTPATIENT
Start: 2025-05-19 | End: 2025-05-20

## 2025-05-19 RX ORDER — POLYETHYLENE GLYCOL 3350 17 G/17G
17 POWDER, FOR SOLUTION ORAL AT BEDTIME
Refills: 0 | Status: DISCONTINUED | OUTPATIENT
Start: 2025-05-19 | End: 2025-06-02

## 2025-05-19 RX ORDER — ASPIRIN 325 MG
1 TABLET ORAL
Qty: 84 | Refills: 0
Start: 2025-05-19 | End: 2025-06-29

## 2025-05-19 RX ORDER — TRAMADOL HYDROCHLORIDE 50 MG/1
1 TABLET, FILM COATED ORAL
Qty: 28 | Refills: 0
Start: 2025-05-19 | End: 2025-05-25

## 2025-05-19 RX ADMIN — TRAMADOL HYDROCHLORIDE 50 MILLIGRAM(S): 50 TABLET, FILM COATED ORAL at 09:30

## 2025-05-19 RX ADMIN — Medication 100 MILLIGRAM(S): at 17:20

## 2025-05-19 RX ADMIN — Medication 3 MILLILITER(S): at 14:00

## 2025-05-19 RX ADMIN — Medication 1 APPLICATION(S): at 09:30

## 2025-05-19 RX ADMIN — Medication 81 MILLIGRAM(S): at 17:19

## 2025-05-19 RX ADMIN — Medication 40 MILLIGRAM(S): at 09:30

## 2025-05-19 RX ADMIN — Medication 1 APPLICATION(S): at 09:31

## 2025-05-19 RX ADMIN — Medication 500 MILLILITER(S): at 14:00

## 2025-05-19 NOTE — DISCHARGE NOTE NURSING/CASE MANAGEMENT/SOCIAL WORK - NSDCPNINST_GEN_ALL_CORE
DO NOT take any Tylenol (Acetaminophen) or narcotics containing Tylenol until after 6:45pm . You received Tylenol during your operation and it can cause damage to your liver if too much is taken within a 24 hour time period. DO NOT take any Ibuprofen ,Advil or Aleeve until after  5:30PM. You received Toradol during your operation and it can cause damage if too much is taken within a 24 hour time period.

## 2025-05-19 NOTE — DISCHARGE NOTE PROVIDER - NSDCCPCAREPLAN_GEN_ALL_CORE_FT
PRINCIPAL DISCHARGE DIAGNOSIS  Diagnosis: Osteoarthritis of right hip  Assessment and Plan of Treatment: Pain control:         -Acetaminophen 500mg - 2 tabs every 8 hours as needed       -Tramadol 50mg - 1 tab every 6 hours - Take only if needed for MODERATE pain       -oxycodone 5mg - 1 tab every 4-6 hours - Take only if needed for SEVERE or BREAKTHROUGH pain  Please do not drive, operate machinery, or make important decisions while taking these medications.   Other Medications: (Standing)  -Aspirin (Enteric Coated) 81mg every 12 hours - to prevent blood clots (for 6 weeks post operatively.)  -Protonix 40mg - 1 tab every 24 hours - to prevent stomach irritation/ulcers  -Senna 8.6mg - 2 pills every 24 hours - stool softener  -Miralax 17g - daily - constipation   Follow up: Please follow up at your prescheduled post-operative follow up appointment with Dr. Fleming for 2 weeks after hospital discharge. Please call with any questions or concerns including fevers, worsening pain, pus from the wounds, redness of the skin and difficulty breathing or heaviness in the chest at [phone].   Please also follow up with your PCP within 1 week to approve resuming home medications.

## 2025-05-19 NOTE — PROVIDER CONTACT NOTE (OTHER) - ACTION/TREATMENT ORDERED:
Plan for discharge home with medications as ordered.  Surgeon, anesthesia and PACU management team aware.  No further interventions noted.

## 2025-05-19 NOTE — DISCHARGE NOTE PROVIDER - NSDCCPTREATMENT_GEN_ALL_CORE_FT
PRINCIPAL PROCEDURE  Procedure: Arthroplasty of right hip  Findings and Treatment: Diet: Continue regular diet upon discharge.   Activity: No heavy lifting > 25 lbs for 4 weeks. Avoid straining or excessive activity x 6 weeks.   -Continue to use your walker when ambulating until your postoperative follow up appointment.   Dressings: Keep dressing clean, dry, and intact. Your doctor will remove your bandage at your post-operative follow up appointment.   Other Care:   -You may shower when you get home but DO NOT soak dressing and/or incision. The water may run over your dressing/incision but DO NOT let the water directly hit your dressing/incision (take a shower with your wound away from the direct stream of water). NO hot tubes, NO bath tubs, NO swimming pools.   -Elevate your operative leg 2 feet above heart level for 2 hours in the morning, 2 hours in the afternoon, and 2 hours in the evening.   -Apply ice for 20min every time you elevate.   -Sit for 90 min/day: 45mins x2 or 30min x3  -DO NOT sit for more than the 90min/day. Walk or lay down when not elevating your leg.   -DO NOT place the elevation pillow behind your knees. Only place it under your calf and heel.   -DO NOT bend more than 45 degrees at the waist

## 2025-05-19 NOTE — OCCUPATIONAL THERAPY INITIAL EVALUATION ADULT - LIVES WITH, PROFILE
Pt lives in a house with his parents and sister, 2 steps to enter, a flight inside, bathroom has a walk-in shower with a shower chair. Pt owns a rolling walker, cane, and crutches./parents

## 2025-05-19 NOTE — DISCHARGE NOTE PROVIDER - HOSPITAL COURSE
This is a 36yo male with no PMH who presents to [facility] for orthopedic surgery. Patient s/p right anterior total hip arthroplasty with Dr. Fleming on 5/19/25. Patient tolerated the procedure well without any intraoperative complications. Patient tolerated physical therapy well, and the pain was controlled. Patient is weight bearing as tolerated with cane/walker as needed, ANTERIOR HIP PRECAUTIONS. Seen by medical attending for continuity of care and management and cleared for safe discharge. Keep dressing/incision clean, dry and intact. Any suture/staples to be removed on post-op day #14 your office visit. Patient is on 81mg of Aspirin for DVT prophylaxis, please take for 6 weeks unless otherwise instructed by your surgeon. Please follow up with Dr. Fleming in 2 weeks. Please follow up with your PMD for continuity of care and management as medications may have changed.    This is a 36yo male with no PMH who presents to [facility] for orthopedic surgery. Patient s/p right anterior total hip arthroplasty with Dr. Fleming on 5/19/25. Patient tolerated the procedure well without any intraoperative complications. Patient tolerated physical therapy well, and the pain was controlled. Patient is weight bearing as tolerated with cane/walker as needed, ANTERIOR HIP PRECAUTIONS. Seen by medical attending for continuity of care and management and cleared for safe discharge. Keep dressing/incision clean, dry and intact. Any suture/staples to be removed on post-op day #14 your office visit. Patient is on 81mg of Aspirin for DVT prophylaxis, please take for 6 weeks unless otherwise instructed by your surgeon. Please follow up with Dr. Fleming in 2 weeks. Please follow up with your PMD for continuity of care and management as medications may have changed.     Home care referral was placed via [employer] on 5/19/25. The patient had a rolling walker delivered to his house prior to his surgery date via [employer].

## 2025-05-19 NOTE — DISCHARGE NOTE PROVIDER - CARE PROVIDER_API CALL
Jessica Fleming  Orthopaedic Surgery  [address], [address]  [place] [address]  Phone: [phone]  Fax: [phone]  Follow Up Time:

## 2025-05-19 NOTE — OCCUPATIONAL THERAPY INITIAL EVALUATION ADULT - ADDITIONAL COMMENTS
Pt left seated in transport chair in NAD with all lines intact, call bell in reach, RN present and aware.

## 2025-05-19 NOTE — DISCHARGE NOTE NURSING/CASE MANAGEMENT/SOCIAL WORK - PATIENT PORTAL LINK FT
You can access the FollowMyHealth Patient Portal offered by [facility] by registering at the following website: [email or web address]. By joining [employer]’s FollowMyHealth portal, you will also be able to view your health information using other applications (apps) compatible with our system.

## 2025-05-19 NOTE — PHYSICAL THERAPY INITIAL EVALUATION ADULT - ADDITIONAL COMMENTS
Pt lives in a private house with parents, +2 steps to enter, +1 flight inside to negotiate, pt was independent with all functional mobility using bilateral axillary crutches, also owns a rolling walker a Single Rossiter Cane.     Pt left semi-supine in bed, all lines intact, all needs in reach, in NAD. SHAAN Childs aware. Heart rate 81 beats per minute.

## 2025-05-19 NOTE — OCCUPATIONAL THERAPY INITIAL EVALUATION ADULT - GENERAL OBSERVATIONS, REHAB EVAL
Pt received semi-supine in bed in NAD, all lines intact +tele +pulse ox. SpO2 100% on RA, HR 74 BPM. Pt OK to be seen for OT per SHAAN Childs.

## 2025-05-19 NOTE — DISCHARGE NOTE PROVIDER - NSDCFUSCHEDAPPT_GEN_ALL_CORE_FT
Jessica Fleming  [facility]  ORTHOSURG [address]  Scheduled Appointment: 06/05/2025

## 2025-05-19 NOTE — PROVIDER CONTACT NOTE (OTHER) - RECOMMENDATIONS
In ASU, MD Lonnie @ bedside.  Spoke with patient regarding self-medicating.  Told patient it is his own body, however he is fixed now and should no longer need any extra medication.

## 2025-05-19 NOTE — OCCUPATIONAL THERAPY INITIAL EVALUATION ADULT - DIAGNOSIS, OT EVAL
Pt with impaired strength in right hip impacting ability to complete ADLs, IADLs, functional mobility/transfers.

## 2025-05-19 NOTE — PROVIDER CONTACT NOTE (OTHER) - ASSESSMENT
Report taken from SHAAN Kim (PACU RN).  Upon transferring to ASU, PACU nurse found syringe and needle in bed.  Questioned patient.  Patient stated took peptide medication for anti-inflammatory.  PACU nurse manager Sera notified.  Ortho team notified, Surgeon called to bedside.

## 2025-05-19 NOTE — DISCHARGE NOTE NURSING/CASE MANAGEMENT/SOCIAL WORK - FINANCIAL ASSISTANCE
[facility] provides services at a reduced cost to those who are determined to be eligible through [facility]’s financial assistance program. Information regarding [facility]’s financial assistance program can be found by going to [email or web address] or by calling [phone].

## 2025-05-19 NOTE — PHYSICAL THERAPY INITIAL EVALUATION ADULT - RANGE OF MOTION EXAMINATION, REHAB EVAL
Right Hip flexion 0-80 degrees/bilateral upper extremity ROM was WFL (within functional limits)/bilateral lower extremity ROM was WFL (within functional limits)

## 2025-05-19 NOTE — OCCUPATIONAL THERAPY INITIAL EVALUATION ADULT - LEVEL OF INDEPENDENCE: DRESS LOWER BODY, OT EVAL
Pt educated on use of hip kit to promote ease, safety, and independence during lower body dressing. Pt deferring practice at this time, verbalizing good understanding.

## 2025-05-19 NOTE — CHART NOTE - NSCHARTNOTEFT_GEN_A_CORE
Orthopedic Surgery team called to bedside in recovering area. As PACU team was preparing patient for discharge they found an empty syringe and vial. Patient admitted to injecting himself with an over the counter supplement that he states helps him with inflammation. It was explained to the patient that he should not take any medications or supplement that are not prescribed in the perioperative period. The patient stated that he understood the concerns but would most likely continue to use the medication against our advice. It was emphasized to the patient that he cannot inject anything near the operative area due to a high risk of introducing an infection.

## 2025-05-19 NOTE — PHYSICAL THERAPY INITIAL EVALUATION ADULT - PERTINENT HX OF CURRENT PROBLEM, REHAB EVAL
Pt is a 35 year old male with past medical history stated below, status post Arthoplasty of right hip, POD #0

## 2025-06-04 PROBLEM — Z96.641 STATUS POST TOTAL REPLACEMENT OF RIGHT HIP: Status: ACTIVE | Noted: 2025-06-04

## 2025-06-05 ENCOUNTER — APPOINTMENT (OUTPATIENT)
Dept: ORTHOPEDIC SURGERY | Facility: CLINIC | Age: 35
End: 2025-06-05
Payer: MEDICAID

## 2025-06-05 VITALS — BODY MASS INDEX: 26.48 KG/M2 | HEIGHT: 70 IN | WEIGHT: 185 LBS

## 2025-06-05 PROCEDURE — 99024 POSTOP FOLLOW-UP VISIT: CPT

## 2025-06-05 PROCEDURE — 72170 X-RAY EXAM OF PELVIS: CPT

## (undated) DEVICE — SUT QUILL MONODERM 3-0 30CM 19MM

## (undated) DEVICE — SYR LUER LOK 20CC

## (undated) DEVICE — DRSG DERMABOND 0.7ML

## (undated) DEVICE — DRAPE 3/4 SHEET 52X76"

## (undated) DEVICE — ELCTR AQUAMANTYS BIPOLAR SEALER 6.0

## (undated) DEVICE — LABELS BLANK W PEN

## (undated) DEVICE — DRAPE SPLIT SHEET 77" X 120"

## (undated) DEVICE — SUT VICRYL 1 36" CTX UNDYED

## (undated) DEVICE — SOL IRR POUR NS 0.9% 1500ML

## (undated) DEVICE — TUBING SUCTION NONCONDUCTIVE 6MM X 12FT

## (undated) DEVICE — DRAPE IOBAN 33" X 23"

## (undated) DEVICE — DRAPE SHOWER CURTAIN ISOLATION

## (undated) DEVICE — ELCTR GROUNDING PAD ADULT COVIDIEN

## (undated) DEVICE — DRAPE LARGE SHEET 72X85"

## (undated) DEVICE — SAW BLADE STRYKER SAGITTAL 3 HOLE OSCILLATING

## (undated) DEVICE — DRSG AQUACEL 3.5 X 6"

## (undated) DEVICE — SUT PDO 2 1/2 CIRCLE 40MM NDL 45CM

## (undated) DEVICE — GLV 7.5 PROTEXIS (WHITE)

## (undated) DEVICE — SOL IRR POUR H2O 1500ML

## (undated) DEVICE — HOOD T5 PEELAWAY

## (undated) DEVICE — SUT VICRYL PLUS 2-0 27" FS-1 UNDYED

## (undated) DEVICE — NDL HYPO SAFE 22G X 1.5" (BLACK)

## (undated) DEVICE — SUT VICRYL PLUS 0 27" OS-6 UNDYED

## (undated) DEVICE — SUCTION YANKAUER NO CONTROL VENT

## (undated) DEVICE — GLV 8 PROTEXIS (CREAM) MICRO

## (undated) DEVICE — DRSG COBAN 6"

## (undated) DEVICE — VENODYNE/SCD SLEEVE CALF MEDIUM

## (undated) DEVICE — PREP CHLORAPREP HI-LITE ORANGE 26ML

## (undated) DEVICE — DRAPE C ARM UNIVERSAL

## (undated) DEVICE — PACK LIJ BASIC ORTHO

## (undated) DEVICE — POSITIONER HANA PATIENT CARE KIT

## (undated) DEVICE — WOUND IRR IRRISEPT W 0.5 CHG

## (undated) DEVICE — ELCTR PLASMA BLADE X 3.0S WIDE TIP

## (undated) DEVICE — NDL HYPO SAFE 21G X 1.5" (GREEN)